# Patient Record
Sex: MALE | Race: WHITE | Employment: OTHER | URBAN - NONMETROPOLITAN AREA
[De-identification: names, ages, dates, MRNs, and addresses within clinical notes are randomized per-mention and may not be internally consistent; named-entity substitution may affect disease eponyms.]

---

## 2021-03-03 RX ORDER — LATANOPROST 50 UG/ML
SOLUTION/ DROPS OPHTHALMIC
COMMUNITY
Start: 2021-01-21

## 2021-03-03 RX ORDER — TRIAMCINOLONE ACETONIDE 5 MG/G
CREAM TOPICAL
COMMUNITY
Start: 2020-12-16

## 2021-03-03 RX ORDER — LISINOPRIL 5 MG/1
5 TABLET ORAL
COMMUNITY
Start: 2021-02-22

## 2021-03-03 RX ORDER — SUCRALFATE 1 G/1
TABLET ORAL
COMMUNITY
Start: 2021-02-22

## 2021-03-03 RX ORDER — OMEPRAZOLE 40 MG/1
CAPSULE, DELAYED RELEASE ORAL
COMMUNITY
Start: 2021-02-22

## 2021-03-05 ENCOUNTER — INITIAL CONSULT (OUTPATIENT)
Dept: SURGERY | Age: 81
End: 2021-03-05
Payer: MEDICARE

## 2021-03-05 VITALS
BODY MASS INDEX: 25.74 KG/M2 | WEIGHT: 164 LBS | HEIGHT: 67 IN | HEART RATE: 50 BPM | TEMPERATURE: 98.6 F | SYSTOLIC BLOOD PRESSURE: 124 MMHG | DIASTOLIC BLOOD PRESSURE: 60 MMHG

## 2021-03-05 DIAGNOSIS — D50.9 IRON DEFICIENCY ANEMIA, UNSPECIFIED IRON DEFICIENCY ANEMIA TYPE: Primary | ICD-10-CM

## 2021-03-05 PROCEDURE — 99214 OFFICE O/P EST MOD 30 MIN: CPT | Performed by: SURGERY

## 2021-03-05 SDOH — HEALTH STABILITY: MENTAL HEALTH: HOW OFTEN DO YOU HAVE A DRINK CONTAINING ALCOHOL?: NEVER

## 2021-03-05 SDOH — HEALTH STABILITY: MENTAL HEALTH: HOW MANY STANDARD DRINKS CONTAINING ALCOHOL DO YOU HAVE ON A TYPICAL DAY?: NOT ASKED

## 2021-03-05 NOTE — PROGRESS NOTES
General Surgery History & Physical  Adrienne Proctor MD  Pt Name: Nicholas Bunch  MRN: [id number]  [place]: 1940  Date of evaluation: 3/5/2021  Primary Care Physician: Kirit Lewis DO    Patient evaluated at the request of Dr. Kirit Lewis  Reason for evaluation: anemia, wt loss, GERD       SUBJECTIVE:  Chief Complaint: No chief complaint on file. History of Present Illness:  EGD       EGD  Nausea: yes  Vomiting: no  Heartburn:no  Dysphagia:no  Hematemesis:no  Epigastric pain:no  Anemia: yes, Hgb 10.7, now 14.3 On iron now  Previous work up date:  Current Treatment:Prilosec 40mg daily, Carafate 1 gram      Colonoscopy  Abd pain: no  Anemia: yes  Bloating:no  Diarrhea: previous,not now  Constipation: no  Melena: no  Hematochezia:no  Rectal Bleeding:no  Rectal/Anal Pain:no  Pruritus: no  Family history colon Cancer: no  Previous colon cancer: no  Previous Colon Polyp: no  Change in bowels: no  Decrease caliber of stool: no  Change in color of stool: no  Previous work up date: None    Patient is a 72-year-old male who was found to be anemic in the last several months he was placed on iron and is now normal.  His hemoglobin was 10.7 and now it is 14.3. He also has lost weight over the last several months. He just does not have an appetite. He denies any diarrhea or constipation. He has never had a colonoscopy. He denies any family history of colon cancer. He has had stomach surgery for bleeding ulcer about 30 years ago. 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 He has been on Prilosec and Carafate and not had any bleeding. He did have several episodes of bleeding after his surgery and was told he had to stop drinking and stop aspirin and take his medicine. He has not had any bleeding for many years.   He does not have any heartburn or epigastric pain or dysphagia      Past Medical History   has a past medical history of Anemia, GERD (gastroesophageal reflux disease), History of gastrointestinal ulcer, Hypertension, Nausea, and Weight loss. Past Surgical History   has a past surgical history that includes hernia repair and Abdomen surgery. Family History  family history is not on file. Social History  Tobacco use:  reports that he has never smoked. He has never used smokeless tobacco.  Alcohol use:  reports no history of alcohol use. Drug use:  has no history on file for drug. Medications  Current Medications:   Current Outpatient Medications   Medication Sig Dispense Refill    Ferrous Gluconate (IRON 27 PO) Take by mouth three times daily      latanoprost (XALATAN) 0.005 % ophthalmic solution INSTILL 1 DROP INTO BOTH EYES EVERY EVENING AS DIRECTED      lisinopril (PRINIVIL;ZESTRIL) 5 MG tablet 5 mg       omeprazole (PRILOSEC) 40 MG delayed release capsule TAKE 1 CAPSULE BY MOUTH EVERY DAY      sucralfate (CARAFATE) 1 GM tablet TAKE 1 TABLET (1 G TOTAL) BY MOUTH DAILY.  triamcinolone (ARISTOCORT) 0.5 % cream APPLY 2 3 TIMES A DAY AS NEEDED TO AFFECTED AREAS ON ARMS. No current facility-administered medications for this visit. Home Medications:   Prior to Admission medications    Medication Sig Start Date End Date Taking? Authorizing Provider   Ferrous Gluconate (IRON 27 PO) Take by mouth three times daily   Yes Historical Provider, MD   latanoprost (XALATAN) 0.005 % ophthalmic solution INSTILL 1 DROP INTO BOTH EYES EVERY EVENING AS DIRECTED 1/21/21  Yes Historical Provider, MD   lisinopril (PRINIVIL;ZESTRIL) 5 MG tablet 5 mg  2/22/21  Yes Historical Provider, MD   omeprazole (PRILOSEC) 40 MG delayed release capsule TAKE 1 CAPSULE BY MOUTH EVERY DAY 2/22/21  Yes Historical Provider, MD   sucralfate (CARAFATE) 1 GM tablet TAKE 1 TABLET (1 G TOTAL) BY MOUTH DAILY. 2/22/21  Yes Historical Provider, MD   triamcinolone (ARISTOCORT) 0.5 % cream APPLY 2 3 TIMES A DAY AS NEEDED TO AFFECTED AREAS ON ARMS.  12/16/20  Yes Historical Provider, MD Allergies  Patient has no known allergies. Review of Systems:  General: Denies any fever, chills. HEENT: Denies any diplopia, tinnitus or vertigo. Respiratory: Denies any shortness of breath or cough. Cardiac: Denies any chest pain, palpitations, claudication or edema. Gastrointestinal: Denies any melena, hematochezia, hematemesis or pyrosis. Genitourinary: Denies any frequency, urgency, hesitancy or incontinence. Hematologic: Denies bruising or bleeding easily. Endocrine: Denies any history of diabetes or thyroid disease. PHYSICAL EXAMINATION  Vitals:   Vitals:    03/05/21 1312   BP: 124/60   Pulse: 50   Temp: 98.6 °F (37 °C)     General Appearance:  awake, alert, oriented, in no acute distress, well developed, well nourished and in no acute distress  Skin:  Skin color, texture, turgor normal. No rashes or lesions. Head/face:  NCAT  Eyes:  No gross abnormalities. , PERRL, Pupils- PERRL. Ears:  canals and TMs NI and External- normal  Nose/Sinuses:  Nares normal. Septum midline. Mucosa normal. No drainage or sinus tenderness. Mouth/Throat:  Mucosa moist.  No lesions. Pharynx without erythema, edema or exudate. Lungs:  Normal expansion. Clear to auscultation. No rales, rhonchi, or wheezing., Breathing Pattern: regular, no distress, Breath sounds: normal  Heart:  Heart sounds are normal.  Regular rate and rhythm without murmur, gallop or rub. Auscultation: Normal S1 and S2.  Regular rhythm. No murmurs, gallops, or rubs. Abdomen:  Soft, non-tender, normal bowel sounds. No bruits, organomegaly or masses.   Musculoskeletal:  negative, negative findings: no erythema, induration, or nodules, ROM of all joints is normal, strength normal  Neurologic:  negative findings: proximal muscle strength normal, speech normal, mental status intact, cranial nerves 2-12 intact, muscle tone normal, muscle strength normal    LABS:  CBC No results found for: WBC, HGB, HCT, PLT  BMP No results found for: NA, K, CL, CO2, BUN, CREATININE, PHOS, MG  LFT's: No results found for: AST, ALT, ALKPHOS, BILITOT, BILIDIR, AMYLASE, LIPASE, LACTATE  COAGS: No results found for: INR, PTT  Lipids: No results found for: CHOL, HDL, LDLCHOLESTEROL, CHOLHDLRATIO, TRIG, VLDL    RADIOLOGY:  All images reviewed and within normal limits unless otherwise specified: Yes    IMPRESSIONS:  1. Anemia Hb was 10.7 prior to Fe  2. No previous CS  3. Hx of gastric surgery for PUD,(probably a antrectomy and bII from pts description    Surgical Risk: moderate risk    PLAN:  1. EGD and CS  2. Then make further recommendations after that    Medical Decision Making: moderate complexity    Thank you for the interesting evaluation. Further recommendations to follow.     Italia Ross MD  Electronically signed 3/5/2021 at 1:53 PM

## 2021-04-01 ENCOUNTER — TELEPHONE (OUTPATIENT)
Dept: SURGERY | Age: 81
End: 2021-04-01

## 2021-04-01 DIAGNOSIS — R63.4 WEIGHT LOSS: Primary | ICD-10-CM

## 2021-04-01 NOTE — TELEPHONE ENCOUNTER
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 Patient recommended to follow up in 3 months. Per Dr. Allie Sue progress note in media tab states \"hx of previous esophageal strictures that required dilation. He was told by the surgeon that he could stretch his esophagus in the office. \" I do not see anywhere in the EGD report that talked about a stricture or narrowing. Do you do this in the office?

## 2021-04-01 NOTE — TELEPHONE ENCOUNTER
Patient's wife called the office because Dr. Marcelo Dobbs was suppose to schedule an appointment with Dr. Aneudy Jerry for patient but they have not heard anything yet. Patient had an EGD on 3/10/2021 and was told he needs something done in the office because his esophagus was narrowing. Please call Stephany Mao back at [phone].

## 2021-04-02 NOTE — TELEPHONE ENCOUNTER
There is a little confusion. I dont think there was an esophageal stricture. But I do think there was a possible stricture in the outlet of the gastric remnant. We should check a gastric emptying study, then have pt back to the office to discuss results and possible tx options including balloon dilation of the possible gastric outlet stricture. But I think it would be good to have in the office to discuss all this and answer their questions. In the mean time I would recommend he stay on the GI meds as recommended on the endo review , on the op note .

## 2021-04-02 NOTE — TELEPHONE ENCOUNTER
Contacted patient and reviewed 's recommendation. Pt states that he has noticed one day after eating a 1/2 grilled cheese sandwichand and fries that it did not pass until the next day and some days he has to force himself to eat. Patient also wants to make note that he doesn't know if he has a polyp/hemorrhoid that comes out when straining and then has fluid leak at times. He states during the Colonoscopy there was no note of this polyp or hemorrhoid. Patient states he has seen Dr. Hiram Barrera in the past and is scheduled with him on 4/6/2021 at [address] [place] [address] for rectal prolapse. Contacted [facility] radiology at [phone] and scheduled gastric emptying study on 4/7/2021 at 7:30 AM with an arrival time of 7:15 AM. Patient is to be NPO after midnight. Patient scheduled for f/u appointment with Dr. Saad Guajardo on 4/14/2021 at 1 PM. Explained to patient the gastric emptying study requires a pre-auth and I will complete that and will contact him if there is any questions or concerns or issues with pre authorizing the gastric emptying study. Patient verbalized understanding.

## 2021-04-06 ENCOUNTER — OFFICE VISIT (OUTPATIENT)
Dept: SURGERY | Age: 81
End: 2021-04-06
Payer: MEDICARE

## 2021-04-06 VITALS
HEIGHT: 67 IN | OXYGEN SATURATION: 97 % | BODY MASS INDEX: 26.45 KG/M2 | TEMPERATURE: 97.6 F | RESPIRATION RATE: 18 BRPM | HEART RATE: 60 BPM | WEIGHT: 168.5 LBS | SYSTOLIC BLOOD PRESSURE: 138 MMHG | DIASTOLIC BLOOD PRESSURE: 60 MMHG

## 2021-04-06 DIAGNOSIS — K62.3 RECTAL PROLAPSE: Primary | ICD-10-CM

## 2021-04-06 PROCEDURE — 99203 OFFICE O/P NEW LOW 30 MIN: CPT | Performed by: SURGERY

## 2021-04-06 NOTE — PROGRESS NOTES
 Mental Illness Mother     Kidney Disease Father         on dialysis    Dementia Sister     No Known Problems Brother     No Known Problems Maternal Grandmother     No Known Problems Maternal Grandfather     No Known Problems Paternal Grandmother     No Known Problems Paternal Grandfather         Social History     Tobacco Use    Smoking status: Never Smoker    Smokeless tobacco: Never Used   Substance Use Topics    Alcohol use: Never     Frequency: Never     Binge frequency: Never          Current Outpatient Medications   Medication Sig Dispense Refill    Ferrous Gluconate (IRON 27 PO) Take by mouth three times daily      latanoprost (XALATAN) 0.005 % ophthalmic solution INSTILL 1 DROP INTO BOTH EYES EVERY EVENING AS DIRECTED      lisinopril (PRINIVIL;ZESTRIL) 5 MG tablet 5 mg       omeprazole (PRILOSEC) 40 MG delayed release capsule TAKE 1 CAPSULE BY MOUTH EVERY DAY      sucralfate (CARAFATE) 1 GM tablet TAKE 1 TABLET (1 G TOTAL) BY MOUTH DAILY.  triamcinolone (ARISTOCORT) 0.5 % cream APPLY 2 3 TIMES A DAY AS NEEDED TO AFFECTED AREAS ON ARMS. No current facility-administered medications for this visit. No Known Allergies    Subjective:      Review of Systems   Constitutional: Positive for appetite change and fatigue. Negative for activity change, chills, diaphoresis, fever and unexpected weight change. HENT: Negative. Negative for congestion, dental problem, drooling, ear discharge, ear pain, facial swelling, hearing loss, mouth sores, nosebleeds, postnasal drip, rhinorrhea, sinus pressure, sinus pain, sneezing, sore throat, tinnitus, trouble swallowing and voice change. Eyes: Negative. Negative for photophobia, pain, discharge, redness, itching and visual disturbance. Respiratory: Negative. Negative for apnea, cough, choking, chest tightness, shortness of breath, wheezing and stridor. Cardiovascular: Negative. Negative for chest pain, palpitations and leg swelling. Gastrointestinal: Positive for rectal pain. Endocrine: Negative. Negative for cold intolerance, heat intolerance, polydipsia, polyphagia and polyuria. Genitourinary: Negative. Negative for decreased urine volume, difficulty urinating, discharge, dysuria, enuresis, flank pain, frequency, genital sores, hematuria, penile pain, penile swelling, scrotal swelling, testicular pain and urgency. Musculoskeletal: Negative. Negative for arthralgias, back pain, gait problem, joint swelling, myalgias, neck pain and neck stiffness. Skin: Negative. Negative for color change, pallor, rash and wound. Allergic/Immunologic: Negative. Negative for environmental allergies, food allergies and immunocompromised state. Neurological: Negative. Negative for dizziness, tremors, seizures, syncope, facial asymmetry, speech difficulty, weakness, light-headedness, numbness and headaches. Hematological: Negative. Negative for adenopathy. Does not bruise/bleed easily. Psychiatric/Behavioral: Negative. Negative for agitation, behavioral problems, confusion, decreased concentration, dysphoric mood, hallucinations, self-injury, sleep disturbance and suicidal ideas. The patient is not nervous/anxious and is not hyperactive. Objective:   /60 (Site: Right Upper Arm, Position: Sitting, Cuff Size: Medium Adult)   Pulse 60   Temp 97.6 °F (36.4 °C) (Temporal)   Resp 18   Ht 5' 7\" (1.702 m)   Wt 168 lb 8 oz (76.4 kg)   SpO2 97%   BMI 26.39 kg/m²     Physical Exam  Constitutional:       Appearance: He is well-developed. HENT:      Head: Normocephalic and atraumatic. Eyes:      General: No scleral icterus. Left eye: No discharge. Conjunctiva/sclera: Conjunctivae normal.      Pupils: Pupils are equal, round, and reactive to light. Neck:      Thyroid: No thyromegaly. Trachea: No tracheal deviation. Cardiovascular:      Rate and Rhythm: Normal rate and regular rhythm.       Heart sounds: Normal

## 2021-04-07 ASSESSMENT — ENCOUNTER SYMPTOMS
CHOKING: 0
COUGH: 0
SINUS PAIN: 0
SORE THROAT: 0
RESPIRATORY NEGATIVE: 1
EYE REDNESS: 0
EYES NEGATIVE: 1
RHINORRHEA: 0
VOICE CHANGE: 0
CHEST TIGHTNESS: 0
SHORTNESS OF BREATH: 0
TROUBLE SWALLOWING: 0
WHEEZING: 0
RECTAL PAIN: 1
ALLERGIC/IMMUNOLOGIC NEGATIVE: 1
APNEA: 0
STRIDOR: 0
EYE DISCHARGE: 0
EYE ITCHING: 0
BACK PAIN: 0
COLOR CHANGE: 0
FACIAL SWELLING: 0
SINUS PRESSURE: 0
PHOTOPHOBIA: 0
EYE PAIN: 0

## 2021-04-08 DIAGNOSIS — R63.4 WEIGHT LOSS: ICD-10-CM

## 2021-04-14 ENCOUNTER — OFFICE VISIT (OUTPATIENT)
Dept: SURGERY | Age: 81
End: 2021-04-14
Payer: MEDICARE

## 2021-04-14 VITALS
BODY MASS INDEX: 26.18 KG/M2 | WEIGHT: 166.8 LBS | HEIGHT: 67 IN | SYSTOLIC BLOOD PRESSURE: 130 MMHG | HEART RATE: 44 BPM | DIASTOLIC BLOOD PRESSURE: 68 MMHG

## 2021-04-14 DIAGNOSIS — D50.9 IRON DEFICIENCY ANEMIA, UNSPECIFIED IRON DEFICIENCY ANEMIA TYPE: Primary | ICD-10-CM

## 2021-04-14 PROCEDURE — 99213 OFFICE O/P EST LOW 20 MIN: CPT | Performed by: SURGERY

## 2021-04-14 NOTE — PROGRESS NOTES
Patient is a 72-year-old male with a history of gastric bypass surgery that we saw last several months for weight loss bloating and anemia. He has been on iron for a long time and is been anemic for a while. Dr. Joe Moralez had started him on Prilosec 40 mg daily recently and he continued on Carafate 1 g a day. We did an EGD and a colonoscopy on 3/10/2021 and found mild gastritis of the remnant questionable stricture of the gastric outlet. We were able to see that there was no retained food. We wanted him to continue the Prilosec and the Carafate and follow-up in 3 months. However he wanted to follow-up and discuss everything. So we are seeing him today. He is currently on Carafate once a day Prilosec 40 mg daily and 625 mg of iron daily. We did a gastric emptying study which showed no delay and actually showed dumping. At this point I do not think I would do EGD and dilatation of the gastric outlet of the remnant at this point. He is improving clearly with the Prilosec Dr. Joe Moralez started. I would give that 3 months and we will see him back. Since his hemoglobin is actually normal I would stop the iron and see if the Prilosec and Carafate will protect him enough to keep a stable hemoglobin over the next 3 months. There could be some absorption problem with the iron with his gastric bypass but at this point we will assume is from the gastritis and possible marginal ulcer. We will see him back in 3 months repeat his hemoglobin and check his anemia. We will see his symptoms. And we will probably redo a EGD to make sure everything is healed and make sure there is no stricture. If there is we may dilate him at that time with balloon dilatation. He states that he bloating is much better since he has been on the Prilosec and he does not feel like things are getting obstructed. Although occasionally they do. We asked him to watch that and keep a diary.   We also want to know about his diarrhea and